# Patient Record
Sex: MALE | Race: WHITE | NOT HISPANIC OR LATINO | Employment: STUDENT | URBAN - METROPOLITAN AREA
[De-identification: names, ages, dates, MRNs, and addresses within clinical notes are randomized per-mention and may not be internally consistent; named-entity substitution may affect disease eponyms.]

---

## 2023-03-10 ENCOUNTER — APPOINTMENT (EMERGENCY)
Dept: RADIOLOGY | Facility: HOSPITAL | Age: 38
End: 2023-03-10

## 2023-03-10 ENCOUNTER — HOSPITAL ENCOUNTER (EMERGENCY)
Facility: HOSPITAL | Age: 38
Discharge: HOME | End: 2023-03-10
Attending: EMERGENCY MEDICINE | Admitting: EMERGENCY MEDICINE

## 2023-03-10 VITALS
RESPIRATION RATE: 18 BRPM | HEART RATE: 76 BPM | OXYGEN SATURATION: 97 % | WEIGHT: 167.11 LBS | SYSTOLIC BLOOD PRESSURE: 135 MMHG | TEMPERATURE: 98.3 F | DIASTOLIC BLOOD PRESSURE: 85 MMHG

## 2023-03-10 DIAGNOSIS — F41.0 PANIC ATTACKS: Primary | ICD-10-CM

## 2023-03-10 LAB
ATRIAL RATE: 63 BPM
P AXIS: 59 DEGREES
PR INTERVAL: 144 MS
QRS AXIS: 65 DEGREES
QRSD INTERVAL: 74 MS
QT INTERVAL: 370 MS
QTC INTERVAL: 378 MS
T WAVE AXIS: 53 DEGREES
VENTRICULAR RATE: 63 BPM

## 2023-03-10 RX ORDER — ALPRAZOLAM 0.25 MG/1
0.25 TABLET ORAL 3 TIMES DAILY PRN
Qty: 15 TABLET | Refills: 0 | Status: SHIPPED | OUTPATIENT
Start: 2023-03-10 | End: 2023-03-17 | Stop reason: ALTCHOICE

## 2023-03-10 RX ORDER — ALPRAZOLAM 0.25 MG/1
0.25 TABLET ORAL ONCE
Status: COMPLETED | OUTPATIENT
Start: 2023-03-10 | End: 2023-03-10

## 2023-03-10 RX ADMIN — ALPRAZOLAM 0.25 MG: 0.25 TABLET ORAL at 02:09

## 2023-03-10 NOTE — ED PROVIDER NOTES
History  Chief Complaint   Patient presents with   • Panic Attack     Reports having 10+panic attacks a day for over 1 week so came in for eval, unable to sleep  Reports bouts of racing thoughts, out of control , ruining everything, dizziness, chest tightness  Hx of panic attacks  Patient is a 30year old male with 1 week of intermittent worsening panic attacks with chest tightness, dizziness, sob, "sense of impending doom"  Has had prior panic attacks before  No SI  States he did not drive here  No recent old records from this ED seen on computer system  [facility] website checked on this patient and no Rx found  Denies cocaine use  History provided by:  Patient   used: No    Panic Attack  Presenting symptoms: no suicidal thoughts    Associated symptoms: anxiety        None       Past Medical History:   Diagnosis Date   • ADHD    • Anxiety    • Depression    • Panic attacks        History reviewed  No pertinent surgical history  History reviewed  No pertinent family history  I have reviewed and agree with the history as documented  E-Cigarette/Vaping   • E-Cigarette Use Never User      E-Cigarette/Vaping Substances   • Nicotine No    • THC No    • CBD No      Social History     Tobacco Use   • Smoking status: Never   • Smokeless tobacco: Never   Vaping Use   • Vaping Use: Never used   Substance Use Topics   • Alcohol use: Yes     Alcohol/week: 1 0 standard drink     Types: 1 Glasses of wine per week   • Drug use: Not Currently       Review of Systems   Constitutional: Negative for fever  Respiratory: Positive for chest tightness and shortness of breath  Gastrointestinal: Negative for nausea and vomiting  Neurological: Positive for dizziness  Psychiatric/Behavioral: Negative for suicidal ideas  The patient is nervous/anxious  All other systems reviewed and are negative  Physical Exam  Physical Exam  Vitals and nursing note reviewed     Constitutional: General: He is in acute distress (moderate)  HENT:      Head: Normocephalic and atraumatic  Mouth/Throat:      Mouth: Mucous membranes are moist    Eyes:      General: No scleral icterus  Extraocular Movements: Extraocular movements intact  Pupils: Pupils are equal, round, and reactive to light  Cardiovascular:      Rate and Rhythm: Normal rate and regular rhythm  Heart sounds: Normal heart sounds  No murmur heard  Pulmonary:      Effort: Pulmonary effort is normal  No respiratory distress  Breath sounds: Normal breath sounds  No stridor  No wheezing, rhonchi or rales  Chest:      Chest wall: No tenderness  Abdominal:      General: Bowel sounds are normal       Palpations: Abdomen is soft  Tenderness: There is no abdominal tenderness  Musculoskeletal:         General: No deformity  Cervical back: Normal range of motion and neck supple  Right lower leg: No edema  Left lower leg: No edema  Skin:     General: Skin is warm and dry  Findings: No erythema or rash  Neurological:      General: No focal deficit present  Mental Status: He is alert and oriented to person, place, and time  Psychiatric:      Comments: Anxious            Vital Signs  ED Triage Vitals   Temperature Pulse Respirations Blood Pressure SpO2   03/10/23 0122 03/10/23 0119 03/10/23 0119 03/10/23 0119 03/10/23 0119   98 3 °F (36 8 °C) 83 20 146/99 98 %      Temp Source Heart Rate Source Patient Position - Orthostatic VS BP Location FiO2 (%)   03/10/23 0122 03/10/23 0119 -- -- --   Oral Monitor         Pain Score       --                  Vitals:    03/10/23 0119 03/10/23 0130 03/10/23 0200   BP: 146/99 143/94 140/89   Pulse: 83 78 82         Visual Acuity      ED Medications  Medications   ALPRAZolam (XANAX) tablet 0 25 mg (0 25 mg Oral Given 3/10/23 0209)       Diagnostic Studies  Results Reviewed     None                 XR chest 1 view portable   ED Interpretation by Mervat Alvarenga Ankita Kebede MD (03/10 0154)   No acute disease read by me  Procedures  ECG 12 Lead Documentation Only    Date/Time: 3/10/2023 2:15 AM  Performed by: Jing Fajardo MD  Authorized by: Jing Fajardo MD     Indications / Diagnosis:  Chest tightness, sob  ECG reviewed by me, the ED Provider: yes    Patient location:  ED  Previous ECG:     Previous ECG:  Unavailable  Rate:     ECG rate:  63    ECG rate assessment: normal    Rhythm:     Rhythm: sinus rhythm    Ectopy:     Ectopy: none    QRS:     QRS axis:  Normal    QRS intervals:  Normal  Conduction:     Conduction: normal    T waves:     T waves: normal    Other findings:     Other findings: early repolarization               ED Course  ED Course as of 03/10/23 0230   Fri Mar 10, 2023   0228 Patient feeling better  CXR and EKG d/w patient  Medical Decision Making  DDx including but not limited to: anxiety, panic attack, substance abuse, depression; doubt suicidal ideation or metabolic abnormality or cardiac etiology or PE  Amount and/or Complexity of Data Reviewed  Radiology: ordered and independent interpretation performed  Decision-making details documented in ED Course  ECG/medicine tests: ordered and independent interpretation performed  Decision-making details documented in ED Course  Risk  Prescription drug management            Disposition  Final diagnoses:   Panic attacks     Time reflects when diagnosis was documented in both MDM as applicable and the Disposition within this note     Time User Action Codes Description Comment    3/10/2023  1:38 AM Dolly Juan Add [F41 0] Panic attack     3/10/2023  1:38 AM Dolly Juan Remove [F41 0] Panic attack     3/10/2023  1:38 AM Dolly Juan Add [F41 0] Panic attacks       ED Disposition     ED Disposition   Discharge    Condition   Stable    Date/Time   Fri Mar 10, 2023  2:28 AM    Comment   Anna Ariza discharge to home/self care  Follow-up Information     Follow up With Specialties Details Why Contact Info Additional Information    [place] Call today Or own primary doctor within week  No driving with xanax  Return sooner if increased pain, difficulty breathing, fever, vomiting, rash, lethargy  [address] [address]  [address]  , [place], [place], [address]          Patient's Medications   Discharge Prescriptions    ALPRAZOLAM (XANAX) 0 25 MG TABLET    Take 1 tablet (0 25 mg total) by mouth 3 (three) times a day as needed for anxiety for up to 5 days       Start Date: 3/10/2023 End Date: 3/15/2023       Order Dose: 0 25 mg       Quantity: 15 tablet    Refills: 0       No discharge procedures on file      PDMP Review       Value Time User    PDMP Reviewed  Yes 3/10/2023  1:24 AM Jian Hallman MD          ED Provider  Electronically Signed by           Jian Hallman MD  03/10/23 0485

## 2023-03-15 DIAGNOSIS — F41.0 PANIC ATTACKS: ICD-10-CM

## 2023-03-16 ENCOUNTER — OFFICE VISIT (OUTPATIENT)
Dept: FAMILY MEDICINE CLINIC | Facility: CLINIC | Age: 38
End: 2023-03-16

## 2023-03-16 VITALS
SYSTOLIC BLOOD PRESSURE: 124 MMHG | WEIGHT: 167 LBS | HEIGHT: 68 IN | TEMPERATURE: 97.4 F | DIASTOLIC BLOOD PRESSURE: 82 MMHG | OXYGEN SATURATION: 97 % | HEART RATE: 105 BPM | BODY MASS INDEX: 25.31 KG/M2

## 2023-03-16 DIAGNOSIS — F32.2 CURRENT SEVERE EPISODE OF MAJOR DEPRESSIVE DISORDER WITHOUT PSYCHOTIC FEATURES, UNSPECIFIED WHETHER RECURRENT (HCC): Primary | ICD-10-CM

## 2023-03-16 DIAGNOSIS — F41.0 PANIC DISORDER: ICD-10-CM

## 2023-03-16 RX ORDER — HYDROXYZINE HYDROCHLORIDE 25 MG/1
25 TABLET, FILM COATED ORAL EVERY 6 HOURS PRN
Qty: 30 TABLET | Refills: 0 | Status: SHIPPED | OUTPATIENT
Start: 2023-03-16

## 2023-03-16 RX ORDER — ALPRAZOLAM 0.25 MG/1
0.25 TABLET ORAL 3 TIMES DAILY PRN
Qty: 15 TABLET | Refills: 0 | OUTPATIENT
Start: 2023-03-16 | End: 2023-03-21

## 2023-03-16 NOTE — PROGRESS NOTES
Name: Dionte Bone      : 1985      MRN: [id number]  Encounter Provider: Yuridia Colvin DO  Encounter Date: 3/16/2023   Encounter department: [facility]    Assessment & Plan     1  Current severe episode of major depressive disorder without psychotic features, unspecified whether recurrent ([facility] [address] )  Assessment & Plan:  - PHQ-9 score of 21 places a serve depression  Denies SI/HI  Notes likely related to recent relationship ending  Was seen in the ED and provided Xanax on 3/10 for 5 days which he notes did help but he understands this would not be the recommend long term treatment for his Depression    - He notes a hx of depressive s/s in the past, does not remember what medicines he was on previously  Plan;  - Discussed risks/benefits of SSRIs  Decided to start on Zoloft for now  Discussed adequate trial of medication would take up to 4-6 weeks  - Recommend Atarax 25 mg prn for anxiety and panic s/s  Discussed can be sedating    - Discussed Mindfulness and deep breathing techniques  -Recommend establishing with Behavioral health provider - resources provided  Discussed [facility] for quicker appointment  - ED precautions discussed  - Follow up prn for any worsening or in 4 weeks to review medications  Orders:  -     hydrOXYzine HCL (ATARAX) 25 mg tablet; Take 1 tablet (25 mg total) by mouth every 6 (six) hours as needed for itching or anxiety    2  Panic disorder  -     hydrOXYzine HCL (ATARAX) 25 mg tablet; Take 1 tablet (25 mg total) by mouth every 6 (six) hours as needed for itching or anxiety      Depression Screening and Follow-up Plan: Patient's depression screening was positive with a PHQ-2 score of 6  Their PHQ-9 score was 21  Dionte Bone is a 30 y o  male with a PMH of anxiety, depression, ADHD, and panic attacks who presented to the office today due to 3-4 weeks of ruminating thoughts and intermittent panic attacks    The patient had a recent stressful event (breakup with girlfriend) which appears to be the  of his symptoms  Based on my discussion with the patient and his PHQ-9 performed in the office today, the plan is to start sertraline 50mg once daily for depression and hydroxazine 25 mg for anxiety and panic  The patient is interested in talk therapy, due to his plans to travel in the next few weeks we discussed [facility]; the patient is willing to try it out  The patient was in agreement with the plan and had no further questions  He was counseled to reach out to the office if he has questions, or if symptoms worsen before his follow up appointment in 4 weeks  Mendel Barlow is a 30 y o  male who presented to the office today due to "panic attacks and obessive and ruminating thoughts"  The patient reported multiple days over the last 3-4 weeks where he felt that he was "in a state of panic"; he described racing thoughts, racing heart, blurred vision, dizziness, and chest tightness  The patient reported that [facility] last week (6 days ago) due to these symptoms; he was given Ativan in the ED which has helped his feelings of panic, but he continues to struggle with ruminating thoughts  The patient attributes these thoughts to a recent breakup he had  He stated that he feels "out of control" of his thoughts, and reported to be "stuck perseverating" on things that occurred throughout the relationship, and finds him self thinking "what could I have done differently? Did I do something wrong? Did she do something wrong? Is she an untrustworthy person?"  This has been affecting his work as he feels that he cannot concentrate  The patient describe his mood to be "pretty bad, low"  He endorsed decreased appetite, a loss of interest in activities, and decreased concentration (which is affecting his work)    He reported sleeping more than usual, and stated that he "doesn't want to be awake" because of his constant thoughts about his breakup  He denied the presence of thoughts to harm himself or others, but noted that he feels like his own thoughts are hurting him  The patient denied the use of drugs at this time - he denied any history of cocaine use, and endorsed the use of marijuana in the past but has not used it in 5-6 years  Depression  This is a new problem  The current episode started 1 to 4 weeks ago  The problem occurs daily  Associated symptoms include fatigue, headaches (intermittent) and a visual change (blurring of vision)  Pertinent negatives include no abdominal pain, arthralgias, change in bowel habit, chest pain, chills, congestion, coughing, diaphoresis, fever, numbness, rash, sore throat, vomiting or weakness  Panic Attack  This is a recurrent problem  The current episode started 1 to 4 weeks ago (patient described a history of 3-4 panic attacks years ago which he attributed to cannabis use)  The problem occurs every several days  The problem has been unchanged  Associated symptoms include fatigue, headaches (intermittent) and a visual change (blurring of vision)  Pertinent negatives include no abdominal pain, arthralgias, change in bowel habit, chest pain, chills, congestion, coughing, diaphoresis, fever, numbness, rash, sore throat, vomiting or weakness  Treatments tried: Ativan (received in the ED) Improvement on treatment: improvement in feelings of panic, continues to have "ruminating" thoughts  Review of Systems   Constitutional: Positive for activity change (decreased), appetite change (decreased) and fatigue  Negative for chills, diaphoresis and fever  HENT: Negative for congestion, ear pain and sore throat  Eyes: Negative for pain and visual disturbance  Respiratory: Negative for cough and shortness of breath  Cardiovascular: Negative for chest pain and palpitations     Gastrointestinal: Negative for abdominal pain, change in bowel habit, constipation, diarrhea and vomiting  Genitourinary: Negative for dysuria and hematuria  Musculoskeletal: Negative for arthralgias and back pain  Skin: Negative for color change and rash  Neurological: Positive for headaches (intermittent)  Negative for seizures, syncope, weakness and numbness  Psychiatric/Behavioral: Positive for depression, dysphoric mood and sleep disturbance (sleeping more than ususal)  Negative for hallucinations and suicidal ideas  All other systems reviewed and are negative  No current outpatient medications on file prior to visit  Objective     /82   Pulse 105   Temp (!) 97 4 °F (36 3 °C)   Ht 5' 8" (1 727 m)   Wt 75 8 kg (167 lb)   SpO2 97%   BMI 25 39 kg/m²     Physical Exam  Vitals reviewed  Constitutional:       General: He is not in acute distress  Appearance: He is not toxic-appearing  HENT:      Head: Normocephalic and atraumatic  Right Ear: External ear normal       Left Ear: External ear normal       Nose: Nose normal       Mouth/Throat:      Mouth: Mucous membranes are moist    Eyes:      Extraocular Movements: Extraocular movements intact  Conjunctiva/sclera: Conjunctivae normal    Cardiovascular:      Rate and Rhythm: Normal rate and regular rhythm  Pulses: Normal pulses  Pulmonary:      Effort: Pulmonary effort is normal       Breath sounds: Normal breath sounds  No wheezing  Abdominal:      General: Bowel sounds are normal       Palpations: Abdomen is soft  Tenderness: There is no abdominal tenderness  There is no guarding  Skin:     General: Skin is warm and dry  Capillary Refill: Capillary refill takes less than 2 seconds  Findings: No rash  Neurological:      General: No focal deficit present  Mental Status: He is alert and oriented to person, place, and time  Psychiatric:         Attention and Perception: He does not perceive auditory or visual hallucinations  Mood and Affect: Mood is depressed  Speech: Speech normal  Speech is not rapid and pressured  Behavior: Behavior is not agitated, aggressive or combative  Behavior is cooperative  Thought Content: Thought content does not include suicidal ideation  Thought content does not include homicidal or suicidal plan  Cognition and Memory: Memory is not impaired        Comments: Limited eye contact throughout encounter, patient primarily looking down at the floor         Guyann Castleman,

## 2023-03-17 ENCOUNTER — OFFICE VISIT (OUTPATIENT)
Dept: FAMILY MEDICINE CLINIC | Facility: CLINIC | Age: 38
End: 2023-03-17

## 2023-03-17 VITALS — BODY MASS INDEX: 24.63 KG/M2 | OXYGEN SATURATION: 98 % | WEIGHT: 162 LBS | HEART RATE: 80 BPM | TEMPERATURE: 98.5 F

## 2023-03-17 DIAGNOSIS — F32.2 CURRENT SEVERE EPISODE OF MAJOR DEPRESSIVE DISORDER WITHOUT PSYCHOTIC FEATURES, UNSPECIFIED WHETHER RECURRENT (HCC): Primary | ICD-10-CM

## 2023-03-17 DIAGNOSIS — F41.0 PANIC DISORDER: ICD-10-CM

## 2023-03-17 DIAGNOSIS — F41.0 PANIC ATTACKS: ICD-10-CM

## 2023-03-17 DIAGNOSIS — F42.9 OBSESSIVE-COMPULSIVE DISORDER, UNSPECIFIED TYPE: ICD-10-CM

## 2023-03-17 RX ORDER — ESCITALOPRAM OXALATE 5 MG/1
5 TABLET ORAL DAILY
Qty: 30 TABLET | Refills: 0 | Status: SHIPPED | OUTPATIENT
Start: 2023-03-17

## 2023-03-17 NOTE — PROGRESS NOTES
Name: Roge Medel      : 1985      MRN: [id number]  Encounter Provider: Monica Cornell DO  Encounter Date: 3/17/2023   Encounter department: [facility]    Assessment & Plan     1  Current severe episode of major depressive disorder without psychotic features, unspecified whether recurrent ([facility] [address] )  Assessment & Plan:  · Severely depressed mood with constant ruminating thoughts, severely depressed mood, constantly sleeping, decreased appetite, panic attack symptoms with ruminating thoughts  · Reports side effects after 1 dose of Zoloft and Atarax-side effects including rigidity and nausea  Discussed with patient that the symptoms are likely unrelated to medication change, especially after 1 dose of medication  Discussed that the SSRIs take 4-6 weeks until at the effective dose and that the patient was started on a low dose  Patient is agreeable to continuing a different SSRI, agrees to trial Lexapro 5mg  · Discussed not taking Atarax while driving or operating heavy machinery due to side effect of sedation  · Denies SI, HI, or delusions  Patient does not meet inpatient criteria for psychiatry         Plan:  · Switched Zoloft to Lexapro 5mg  · Continue Hydroxyzine 25 mg prn for panic attack  · Referral for Psychiatry placed-Messaged Dr Mirella Wilkins to get on his schedule ASAP  · Referral for [address]  · Reviewed coping mechanisms in office including:  · 3 happy/positive thoughts per day  · Deep breathing exercises  · Grounding techniques-feeling feet, hands, clothing against body, pressure of the ground/chair, to help break ruminating thoughts  · Avoid journaling-patient becomes obsessive with re-reading it and makes ruminating thoughts worse  · Discussed ED precautions, informed patient to go directly to the ED if he develops any thoughts of self harm or homicidal ideations or suicidal ideations    Orders:  -     Ambulatory Referral to Psychiatry; Future  -     escitalopram (LEXAPRO) 5 mg tablet; Take 1 tablet (5 mg total) by mouth daily  -     Ambulatory Referral to [place] [facility]; Future    2  Panic disorder  -     Ambulatory Referral to Psychiatry; Future  -     escitalopram (LEXAPRO) 5 mg tablet; Take 1 tablet (5 mg total) by mouth daily  -     Ambulatory Referral to [place] [facility]; Future    3  Panic attacks  -     Ambulatory Referral to Psychiatry; Future  -     escitalopram (LEXAPRO) 5 mg tablet; Take 1 tablet (5 mg total) by mouth daily  -     Ambulatory Referral to [place] [facility]; Future    4  Obsessive-compulsive disorder, unspecified type         Subjective      Jodell Chicho, 29 yo male with pmhx of severe depression, panic disorder, and OCD presenting to office for side-effects of new medication  Patient was seen yesterday 03/16 with Dr Bernardino Baker for Severe Depression, ruminating thoughts, OCD and panic disorder  His recent trigger includes  breaking up with his girlfriend 2 weeks ago and since then he notes constant ruminating thoughts, severely depressed mood, constantly sleeping, decreased appetite, panic attack symptoms with ruminating thoughts  He was started on Zoloft 50mg and Atarax at yesterdays visit and after taking 1 dose of each medication, he feels like he has rigidity with new trembling, shaking, new nausea and was worried about taking the medications  He also stated that he researched the side effects after taking the medication and noted they were similar to his presentation, which is what prompted him to come to the office today  Patient still endorses difficulty concentrating, decreased appetite, increased sleeping, occasional chest palpitations  He saw [facility] earlier this morning and reports they recommend Psychiatry for OCD flare-up  Patient has used deep breathing exercises to help break his ruminating thoughts    He reports trialing writing in a journal, however this worsened his obsession and ruminating thoughts about his recent break-up  At today's visit, he denies any SI or HI or delusions  However, he has a very flat affect and very depressed mood  Please let me know if you think he is appropriate to be scheduled with you and if so, when he can be scheduled with you  Review of Systems   Constitutional: Positive for activity change (decreased) and appetite change (decreased)  Respiratory: Negative for shortness of breath  Cardiovascular: Positive for palpitations  Negative for chest pain and leg swelling  Gastrointestinal: Positive for nausea  Negative for vomiting  Musculoskeletal:        Muscle rigidity   Neurological: Positive for tremors  Psychiatric/Behavioral: Positive for decreased concentration, dysphoric mood and sleep disturbance (decreased)  Negative for hallucinations, self-injury and suicidal ideas  The patient is not nervous/anxious and is not hyperactive  Current Outpatient Medications on File Prior to Visit   Medication Sig   • hydrOXYzine HCL (ATARAX) 25 mg tablet Take 1 tablet (25 mg total) by mouth every 6 (six) hours as needed for itching or anxiety   • [DISCONTINUED] sertraline (Zoloft) 50 mg tablet Take 1 tablet (50 mg total) by mouth daily   • [DISCONTINUED] ALPRAZolam (Xanax) 0 25 mg tablet Take 1 tablet (0 25 mg total) by mouth 3 (three) times a day as needed for anxiety for up to 5 days (Patient not taking: Reported on 3/16/2023)       Objective     Pulse 80   Temp 98 5 °F (36 9 °C)   Wt 73 5 kg (162 lb)   SpO2 98%   BMI 24 63 kg/m²     Physical Exam  Vitals and nursing note reviewed  Eyes:      Pupils: Pupils are equal, round, and reactive to light  Cardiovascular:      Rate and Rhythm: Normal rate and regular rhythm  Pulmonary:      Effort: Pulmonary effort is normal  No respiratory distress  Breath sounds: Normal breath sounds  No stridor  No wheezing, rhonchi or rales     Abdominal:      General: Abdomen is flat  Bowel sounds are normal  There is no distension  Palpations: Abdomen is soft  Tenderness: There is no abdominal tenderness  There is no guarding  Skin:     General: Skin is warm and dry  Psychiatric:         Mood and Affect: Mood is depressed  Affect is blunt and flat  Speech: Speech is delayed  Behavior: Behavior is withdrawn  Thought Content: Thought content is not paranoid or delusional  Thought content does not include homicidal or suicidal ideation  Thought content does not include homicidal or suicidal plan  Judgment: Judgment is inappropriate  Judgment is not impulsive  Comments: Very flat, blunted affect with depressed mood  Patient avoids eye contact and appears withdrawn         Anshul Cancer, DO

## 2023-03-17 NOTE — ASSESSMENT & PLAN NOTE
· Severely depressed mood with constant ruminating thoughts, severely depressed mood, constantly sleeping, decreased appetite, panic attack symptoms with ruminating thoughts  · Reports side effects after 1 dose of Zoloft and Atarax-side effects including rigidity and nausea  Discussed with patient that the symptoms are likely unrelated to medication change, especially after 1 dose of medication  Discussed that the SSRIs take 4-6 weeks until at the effective dose and that the patient was started on a low dose  Patient is agreeable to continuing a different SSRI, agrees to trial Lexapro 5mg  · Discussed not taking Atarax while driving or operating heavy machinery due to side effect of sedation  · Denies SI, HI, or delusions  Patient does not meet inpatient criteria for psychiatry         Plan:  · Switched Zoloft to Lexapro 5mg  · Continue Hydroxyzine 25 mg prn for panic attack  · Referral for Psychiatry placed-Messaged Dr Jeff Alegria to get on his schedule ASAP  · Referral for [facility]  · Reviewed coping mechanisms in office including:  · 3 happy/positive thoughts per day  · Deep breathing exercises  · Grounding techniques-feeling feet, hands, clothing against body, pressure of the ground/chair, to help break ruminating thoughts  · Avoid journaling-patient becomes obsessive with re-reading it and makes ruminating thoughts worse  · Discussed ED precautions, informed patient to go directly to the ED if he develops any thoughts of self harm or homicidal ideations or suicidal ideations

## 2023-03-20 NOTE — ASSESSMENT & PLAN NOTE
- PHQ-9 score of 21 places a serve depression  Denies SI/HI  Notes likely related to recent relationship ending  Was seen in the ED and provided Xanax on 3/10 for 5 days which he notes did help but he understands this would not be the recommend long term treatment for his Depression    - He notes a hx of depressive s/s in the past, does not remember what medicines he was on previously  Plan;  - Discussed risks/benefits of SSRIs  Decided to start on Zoloft for now  Discussed adequate trial of medication would take up to 4-6 weeks  - Recommend Atarax 25 mg prn for anxiety and panic s/s  Discussed can be sedating    - Discussed Mindfulness and deep breathing techniques  -Recommend establishing with Behavioral health provider - resources provided  Discussed [facility]oday  com for quicker appointment  - ED precautions discussed  - Follow up prn for any worsening or in 4 weeks to review medications

## 2023-04-04 ENCOUNTER — TELEPHONE (OUTPATIENT)
Dept: FAMILY MEDICINE CLINIC | Facility: CLINIC | Age: 38
End: 2023-04-04

## 2023-04-14 PROBLEM — G47.9 SLEEP DISTURBANCE: Status: ACTIVE | Noted: 2023-04-14

## 2023-05-10 DIAGNOSIS — F41.0 PANIC ATTACKS: ICD-10-CM

## 2023-05-10 DIAGNOSIS — M62.838 NIGHT MUSCLE SPASMS: ICD-10-CM

## 2023-05-10 RX ORDER — PROPRANOLOL HYDROCHLORIDE 10 MG/1
10 TABLET ORAL ONCE AS NEEDED
Qty: 30 TABLET | Refills: 0 | Status: SHIPPED | OUTPATIENT
Start: 2023-05-10

## 2023-05-10 RX ORDER — METHOCARBAMOL 500 MG/1
500 TABLET, FILM COATED ORAL
Qty: 30 TABLET | Refills: 0 | Status: SHIPPED | OUTPATIENT
Start: 2023-05-10

## 2023-05-11 ENCOUNTER — TELEPHONE (OUTPATIENT)
Dept: PSYCHIATRY | Facility: CLINIC | Age: 38
End: 2023-05-11